# Patient Record
Sex: FEMALE | Race: WHITE
[De-identification: names, ages, dates, MRNs, and addresses within clinical notes are randomized per-mention and may not be internally consistent; named-entity substitution may affect disease eponyms.]

---

## 2018-08-27 ENCOUNTER — HOSPITAL ENCOUNTER (OUTPATIENT)
Dept: HOSPITAL 92 - BICMRI | Age: 43
Discharge: HOME | End: 2018-08-27
Attending: INTERNAL MEDICINE
Payer: COMMERCIAL

## 2018-08-27 DIAGNOSIS — M51.84: ICD-10-CM

## 2018-08-27 DIAGNOSIS — R93.422: ICD-10-CM

## 2018-08-27 DIAGNOSIS — M51.26: ICD-10-CM

## 2018-08-27 DIAGNOSIS — M54.9: Primary | ICD-10-CM

## 2018-08-27 DIAGNOSIS — M51.87: ICD-10-CM

## 2018-08-27 PROCEDURE — A9579 GAD-BASE MR CONTRAST NOS,1ML: HCPCS

## 2018-08-27 PROCEDURE — 72158 MRI LUMBAR SPINE W/O & W/DYE: CPT

## 2018-08-27 PROCEDURE — 72157 MRI CHEST SPINE W/O & W/DYE: CPT

## 2019-07-16 ENCOUNTER — HOSPITAL ENCOUNTER (OUTPATIENT)
Dept: HOSPITAL 92 - RAD | Age: 44
Discharge: HOME | End: 2019-07-16
Attending: INTERNAL MEDICINE
Payer: COMMERCIAL

## 2019-07-16 DIAGNOSIS — R06.00: Primary | ICD-10-CM

## 2019-07-16 PROCEDURE — 71046 X-RAY EXAM CHEST 2 VIEWS: CPT

## 2019-07-16 NOTE — RAD
XR Chest Pa   Lat @ [facility]



HISTORY: Dyspnea



COMPARISON: None.



FINDINGS: Heart size and mediastinum are within normal limits. The lungs are clear of infiltrates. Th
ere is elevation to the right hemidiaphragm



IMPRESSION: No active intrathoracic disease.



Reported By: Blair Garcia 

Electronically Signed:  7/16/2019 12:04 PM

## 2021-05-24 ENCOUNTER — HOSPITAL ENCOUNTER (OUTPATIENT)
Dept: HOSPITAL 92 - BICMRI | Age: 46
Discharge: HOME | End: 2021-05-24
Attending: PHYSICIAN ASSISTANT
Payer: COMMERCIAL

## 2021-05-24 DIAGNOSIS — R10.9: Primary | ICD-10-CM

## 2021-05-24 DIAGNOSIS — R11.0: ICD-10-CM

## 2021-05-24 DIAGNOSIS — K76.0: ICD-10-CM

## 2021-05-24 DIAGNOSIS — R74.8: ICD-10-CM

## 2021-05-24 DIAGNOSIS — K86.2: ICD-10-CM

## 2021-05-24 LAB — ESTIMATED GFR-MDRD - POC: 90

## 2021-05-24 PROCEDURE — 74183 MRI ABD W/O CNTR FLWD CNTR: CPT

## 2021-05-24 PROCEDURE — 82565 ASSAY OF CREATININE: CPT

## 2021-05-24 PROCEDURE — A9579 GAD-BASE MR CONTRAST NOS,1ML: HCPCS
